# Patient Record
Sex: FEMALE | Race: WHITE | NOT HISPANIC OR LATINO | URBAN - METROPOLITAN AREA
[De-identification: names, ages, dates, MRNs, and addresses within clinical notes are randomized per-mention and may not be internally consistent; named-entity substitution may affect disease eponyms.]

---

## 2018-11-05 ENCOUNTER — RECORDS - HEALTHEAST (OUTPATIENT)
Dept: LAB | Facility: CLINIC | Age: 38
End: 2018-11-05

## 2018-11-06 LAB — WALNUT IGE QN: <0.35 KU/L

## 2020-07-22 ENCOUNTER — VIRTUAL VISIT (OUTPATIENT)
Dept: FAMILY MEDICINE | Facility: OTHER | Age: 40
End: 2020-07-22

## 2020-07-23 NOTE — PROGRESS NOTES
"Date: 2020 08:07:11  Clinician: Cate Downey  Clinician NPI: [id number]  Patient: Jerri Dupont  Patient : 1980  Patient Address: [address], [place] [address]  Patient Phone: [phone]  Visit Protocol: URI  Patient Summary:  Jerri is a 40 year old ( : 1980 ) female who initiated a Visit for COVID-19 (Coronavirus) evaluation and screening. When asked the question \"Please sign me up to receive news, health information and promotions. \", Jerri responded \"No\".    Jerri states her symptoms started suddenly 5-6 days ago.   Her symptoms consist of nausea, ageusia, myalgia, a sore throat, nasal congestion, malaise, a headache, and chills. She is experiencing mild difficulty breathing with activities but can speak normally in full sentences.   Symptom details     Nasal secretions: The color of her mucus is clear.    Sore throat: Jerri reports having mild throat pain (1-3 on a 10 point pain scale), does not have exudate on her tonsils, and can swallow liquids. The lymph nodes in her neck are not enlarged. A rash has not appeared on the skin since the sore throat started.     Headache: She states the headache is moderate (4-6 on a 10 point pain scale).      Jerri denies having wheezing, teeth pain, diarrhea, anosmia, facial pain or pressure, fever, cough, vomiting, rhinitis, ear pain, and enlarged lymph nodes. She also denies having recent facial or sinus surgery in the past 60 days, double sickening (worsening symptoms after initial improvement), and taking antibiotic medication in the past month.   Precipitating events  Within the past week, Jerri has not been exposed to someone with strep throat. She has not recently been exposed to someone with influenza. Jerri has been in close contact with the following high risk individuals: adults 65 or older, pregnant women, people with asthma, heart disease or diabetes, immunocompromised people, and children under the age of " 5.   Pertinent COVID-19 (Coronavirus) information  In the past 14 days, Jerri has not worked in a congregate living setting.   She either works or volunteers as a healthcare worker or a , or works or volunteers in a healthcare facility. 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 She lives with a healthcare worker.   Jerri has had a close contact with a laboratory-confirmed COVID-19 patient within 14 days of symptom onset. She was not exposed at her work. Additional information about contact with COVID-19 (Coronavirus) patient as reported by the patient (free text): My son tested positive on7/15 6 days after presenting with symptoms I tested curbside at the clinic I work in. He does not live with me but I have had direct exposure with him since he had showed symptoms   Pertinent medical history  Jerri typically gets a yeast infection when she takes antibiotics. She has used fluconazole (Diflucan) to treat previous yeast infections. 1 dose of fluconazole (Diflucan) has typically been sufficient for symptoms to resolve in the past.   Jerri needs a return to work/school note.   Weight: 195 lbs   Jerri does not smoke or use smokeless tobacco.   She is not sure if she is pregnant and denies breastfeeding. She does not menstruate.   Additional information as reported by the patient (free text): Nerve pain, chest pain, dizziness constipation   Weight: 195 lbs  A synchronous phone visit was initiated by the provider for the following reason: chest pain    MEDICATIONS: Ambien oral, Diuretic oral, cyanocobalamin (vit B-12) injection, ALLERGIES: nickel, Zoloft  Clinician Response:  Dear Jerri,  I am sorry you are not feeling well. To determine the most appropriate care for you, I would like you to be seen in person to further discuss your health  history and symptoms.  You will not be charged for this Visit. Thank you for trusting us with your care.  COVID-19 (Coronavirus) General Information  Because there is currently no vaccine to prevent infection, the best way to protect yourself is to avoid being exposed to this virus. Common symptoms of COVID-19 include but are not limited to fever, cough, and shortness of breath. These symptoms appear 2-14 days after you are exposed to the virus that causes COVID-19. Click here for more information from the CDC on how to protect yourself.  If you are sick with COVID-19 or suspect you are infected with the virus that causes COVID-19, follow the steps here from the CDC to help prevent the disease from spreading to people in your home and community.  Click here for general information from the CDC on testing.  If you develop any of these emergency warning signs for COVID-19, get medical attention immediately:     Trouble breathing    Persistent pain or pressure in the chest    New confusion or inability to arouse    Bluish lips or face      Call your doctor or clinic before going in. Call 911 if you have a medical emergency and notify the  you have or think you may have COVID-19.  For more detailed and up to date information on COVID-19 (Coronavirus), please visit the CDC website.   Diagnosis: Refer for additional evaluation  Diagnosis ICD: R69  Triage Notes: I reviewed the patient's history, verified their identity, and explained the Visit process.    Patient having significant short of breath on the phone Recommend [facility]  she declined ambulance. risks discussed. not yet sure which facility to go to.  Synchronous Triage: phone, status: completed, duration: 307 seconds

## 2020-07-27 ENCOUNTER — COMMUNICATION - HEALTHEAST (OUTPATIENT)
Dept: EMERGENCY MEDICINE | Facility: CLINIC | Age: 40
End: 2020-07-27

## 2021-06-20 NOTE — LETTER
Letter by Milena Davis RN at      Author: Milena Davis RN Service: -- Author Type: --    Filed:  Encounter Date: 7/27/2020 Status: (Other)       7/27/2020        Jerri Barton[address] [place] [address]    2019-nCOV   Date Value Ref Range Status   07/22/2020   Final    Test received-See reflex to IDDL test SARS CoV2 (COVID-19) Virus RT-PCR     Comment:       Performed and/or entered by:  [address] [address]        SARS-CoV-2 PCR Result   Date Value Ref Range Status   07/22/2020 NEGATIVE  Final     Comment:     SARS-CoV2 (COVID-19) RNA not detected, presumed negative.     SARS-COV-2 PCR COMMENT   Date Value Ref Range Status   07/22/2020   Final    Testing was performed using the Xpert Xpress SARS-CoV-2 Assay on the [facility]     Comment:     [employer]. Additional information about this Emergency Use  Authorization (EUA) assay can be found via the Lab Guide.  This test should be ordered for the detection of SARS-CoV-2 in individuals who  meet SARS-CoV-2 clinical and/or epidemiological criteria. Test performance is  unknown in asymptomatic patients.  This test is for in vitro diagnostic use under the FDA EUA for laboratories  certified under CLIA to perform high complexity testing. This test has not been  FDA cleared or approved.  A negative result does not rule out the presence of PCR inhibitors in the  specimen or target RNA in concentration below the limit of detection for the  assay. The possibility of a false negative should be considered if the  patient's recent exposure or clinical presentation suggests COVID-19.  This test was validated by the [facility]. This laboratory is certified under the Clinical Laboratory  Improvement Amendments of 1988 (CLIA-88) as qualified to perform high  complexity laboratory testing.    Performed and/or entered by:  [facility]  [address] [address]        This letter provides a written record that you were tested for COVID-19.    Your result was negative. This means that we didnt find the virus that causes COVID-19 in your sample. A test may show negative when you do actually have the virus. This can happen when the virus is in the early stages of infection, before you feel illness symptoms.    If you have symptoms   Stay home and away from others (self-isolate) until you meet ALL of the guidelines below:    Youve had no fever--and no medicine that reduces fever--for 3 full days (72 hours). And ?    Your other symptoms have gotten better. For example, your cough or breathing has improved. And?    At least 10 days have passed since your symptoms started.    During this time:    Stay home. Dont go to work, school or anywhere else.     Stay in your own room, including for meals. Use your own bathroom if you can.    Stay away from others in your home. No hugging, kissing or shaking hands. No visitors.    Clean high touch surfaces often (doorknobs, counters, handles, etc.). Use a household cleaning spray or wipes. You can find a full list on the EPA website at www.epa.gov/pesticide-registration/list-n-disinfectants-use-against-sars-cov-2.    Cover your mouth and nose with a mask, tissue or washcloth to avoid spreading germs.    Wash your hands and face often with soap and water.    Going back to work  Check with your employer for any guidelines to follow for going back to work.    Employers: This document serves as formal notice that your employee tested negative for COVID-19, as of the testing date shown above.

## 2021-06-26 ENCOUNTER — HEALTH MAINTENANCE LETTER (OUTPATIENT)
Age: 41
End: 2021-06-26

## 2021-10-16 ENCOUNTER — HEALTH MAINTENANCE LETTER (OUTPATIENT)
Age: 41
End: 2021-10-16

## 2022-07-17 ENCOUNTER — HEALTH MAINTENANCE LETTER (OUTPATIENT)
Age: 42
End: 2022-07-17

## 2022-09-25 ENCOUNTER — HEALTH MAINTENANCE LETTER (OUTPATIENT)
Age: 42
End: 2022-09-25

## 2023-08-05 ENCOUNTER — HEALTH MAINTENANCE LETTER (OUTPATIENT)
Age: 43
End: 2023-08-05

## 2024-03-02 ENCOUNTER — HEALTH MAINTENANCE LETTER (OUTPATIENT)
Age: 44
End: 2024-03-02